# Patient Record
Sex: FEMALE | Race: WHITE | Employment: FULL TIME | URBAN - METROPOLITAN AREA
[De-identification: names, ages, dates, MRNs, and addresses within clinical notes are randomized per-mention and may not be internally consistent; named-entity substitution may affect disease eponyms.]

---

## 2023-09-30 SDOH — ECONOMIC STABILITY: FOOD INSECURITY: WITHIN THE PAST 12 MONTHS, YOU WORRIED THAT YOUR FOOD WOULD RUN OUT BEFORE YOU GOT MONEY TO BUY MORE.: NEVER TRUE

## 2023-09-30 SDOH — ECONOMIC STABILITY: HOUSING INSECURITY
IN THE LAST 12 MONTHS, WAS THERE A TIME WHEN YOU DID NOT HAVE A STEADY PLACE TO SLEEP OR SLEPT IN A SHELTER (INCLUDING NOW)?: NO

## 2023-09-30 SDOH — ECONOMIC STABILITY: FOOD INSECURITY: WITHIN THE PAST 12 MONTHS, THE FOOD YOU BOUGHT JUST DIDN'T LAST AND YOU DIDN'T HAVE MONEY TO GET MORE.: NEVER TRUE

## 2023-09-30 SDOH — ECONOMIC STABILITY: TRANSPORTATION INSECURITY
IN THE PAST 12 MONTHS, HAS LACK OF TRANSPORTATION KEPT YOU FROM MEETINGS, WORK, OR FROM GETTING THINGS NEEDED FOR DAILY LIVING?: NO

## 2023-09-30 SDOH — ECONOMIC STABILITY: INCOME INSECURITY: HOW HARD IS IT FOR YOU TO PAY FOR THE VERY BASICS LIKE FOOD, HOUSING, MEDICAL CARE, AND HEATING?: NOT HARD AT ALL

## 2023-10-01 NOTE — PROGRESS NOTES
10/2/2023    Gavicurtis Rodriguez  1990      PCP: Andre Rowe MD  Patient does see them for regular preventative visits. HPI: 34y.o. year old No obstetric history on file. Here for annual gyn wellness exam.     Bryce Current hx breast cancer in her mother at age 45. Dx in 2018. Brca 1&2 neg. When last seen in 2021, pt was not sure what other mutations her mother was tested for. VZ immune  BC = was trying to conceive. His vas was reversed. His SA was good. He is 40. They are not interested in ART. Were planning to try for a yr, then stop if unsuccessful. She has reg cycles. But very symptomatic with menses so wants mirena replaced. Heavy menses. PMS. Felt good when she had mirena. Patient's last menstrual period was 09/14/2023. Social History     Socioeconomic History    Marital status:      Spouse name: None    Number of children: None    Years of education: None    Highest education level: None   Tobacco Use    Smoking status: Never    Smokeless tobacco: Never   Vaping Use    Vaping Use: Never used   Substance and Sexual Activity    Alcohol use: Yes     Alcohol/week: 5.0 standard drinks of alcohol     Types: 5 Glasses of wine per week     Comment: social    Drug use: Never    Sexual activity: Yes     Partners: Male     Birth control/protection: I.U.D., Surgical     Last pap - NL cytology 2021. No hx abnl paps  Lipids- per PCP  Gardasil - has had  Mammogram- NL at [facility]. 3-2023. BD2      Allergies, medications, past medical and surgical history, social history, family history all reviewed.        Review of Systems      Constitutional:  Denies unexplained weight loss/gain or heat/ cold intolerance/loss of balance  ENT: Denies blurred vision, loss of hearing, hoarseness  Cardiovascular:  Denies chest pain, swelling in legs or feet, shortness of breath when lying flat  Respiratory:  Denies shortness of breath, cough greater than 2 weeks or coughing up blood  Gastro: Denies diarrhea greater than

## 2023-10-02 ENCOUNTER — OFFICE VISIT (OUTPATIENT)
Dept: OBGYN CLINIC | Age: 33
End: 2023-10-02
Payer: COMMERCIAL

## 2023-10-02 VITALS
HEIGHT: 68 IN | SYSTOLIC BLOOD PRESSURE: 120 MMHG | BODY MASS INDEX: 27.28 KG/M2 | DIASTOLIC BLOOD PRESSURE: 78 MMHG | WEIGHT: 180 LBS

## 2023-10-02 DIAGNOSIS — Z80.3 FAMILY HISTORY OF BREAST CANCER IN MOTHER: ICD-10-CM

## 2023-10-02 DIAGNOSIS — Z12.4 SCREENING FOR CERVICAL CANCER: ICD-10-CM

## 2023-10-02 DIAGNOSIS — N88.2 CERVICAL STENOSIS (UTERINE CERVIX): ICD-10-CM

## 2023-10-02 DIAGNOSIS — Z30.430 ENCOUNTER FOR IUD INSERTION: ICD-10-CM

## 2023-10-02 DIAGNOSIS — N92.0 MENORRHAGIA WITH REGULAR CYCLE: ICD-10-CM

## 2023-10-02 DIAGNOSIS — Z11.51 SCREENING FOR HUMAN PAPILLOMAVIRUS (HPV): ICD-10-CM

## 2023-10-02 DIAGNOSIS — Z01.419 WELL WOMAN EXAM WITH ROUTINE GYNECOLOGICAL EXAM: Primary | ICD-10-CM

## 2023-10-02 PROBLEM — Z97.5 IUD (INTRAUTERINE DEVICE) IN PLACE: Status: RESOLVED | Noted: 2021-01-18 | Resolved: 2023-10-02

## 2023-10-02 PROBLEM — G43.109 MIGRAINE WITH AURA AND WITHOUT STATUS MIGRAINOSUS, NOT INTRACTABLE: Status: ACTIVE | Noted: 2021-01-18

## 2023-10-02 PROBLEM — Z97.5 IUD (INTRAUTERINE DEVICE) IN PLACE: Status: ACTIVE | Noted: 2021-01-18

## 2023-10-02 PROCEDURE — 99395 PREV VISIT EST AGE 18-39: CPT | Performed by: OBSTETRICS & GYNECOLOGY

## 2023-10-02 RX ORDER — MISOPROSTOL 100 UG/1
200 TABLET ORAL ONCE
Qty: 2 TABLET | Refills: 0 | Status: SHIPPED | OUTPATIENT
Start: 2023-10-02 | End: 2023-10-02

## 2023-10-02 RX ORDER — LORAZEPAM 0.5 MG/1
1 TABLET ORAL
Qty: 1 TABLET | Refills: 0 | Status: SHIPPED | OUTPATIENT
Start: 2023-10-02 | End: 2023-10-02

## 2023-10-02 RX ORDER — RIZATRIPTAN BENZOATE 10 MG/1
10 TABLET ORAL
COMMUNITY

## 2023-10-04 LAB
CYTOLOGIST CVX/VAG CYTO: NORMAL
CYTOLOGY CVX/VAG DOC THIN PREP: NORMAL
HPV APTIMA: NEGATIVE
Lab: NORMAL
PATH REPORT.FINAL DX SPEC: NORMAL
STAT OF ADQ CVX/VAG CYTO-IMP: NORMAL

## 2023-11-06 ENCOUNTER — OFFICE VISIT (OUTPATIENT)
Dept: SURGERY | Age: 33
End: 2023-11-06
Payer: COMMERCIAL

## 2023-11-06 VITALS — HEIGHT: 68 IN | BODY MASS INDEX: 27.43 KG/M2 | WEIGHT: 181 LBS

## 2023-11-06 DIAGNOSIS — R92.2 DENSE BREAST TISSUE: ICD-10-CM

## 2023-11-06 DIAGNOSIS — Z91.89 AT HIGH RISK FOR BREAST CANCER: ICD-10-CM

## 2023-11-06 DIAGNOSIS — Z80.3 FAMILY HISTORY OF BREAST CANCER: Primary | ICD-10-CM

## 2023-11-06 PROCEDURE — 99203 OFFICE O/P NEW LOW 30 MIN: CPT | Performed by: SURGERY

## 2023-11-06 ASSESSMENT — ENCOUNTER SYMPTOMS
ALLERGIC/IMMUNOLOGIC NEGATIVE: 1
EYES NEGATIVE: 1
GASTROINTESTINAL NEGATIVE: 1
RESPIRATORY NEGATIVE: 1

## 2023-11-06 NOTE — PROGRESS NOTES
11/6/2023    Colten Coats  MRN: [id number]      CHIEF COMPLAINT: Family history of breast cancer      PRIMARY CARE PHYSICIAN: Aria Vuong MD      HISTORY:  Mother diagnosed with breast cancer at 45years old. She had triple positive breast cancer. Was reportedly BRCA negative. Not clear what what genetic testing she had done. Patient started her menstrual cycles at 15. She has no children herself. Her mother is 52years old currently. Patient is a non-smoker. Lifetime risk calculation is 25%. Getting mammograms regularly for the last several years. Her most recent mammogram on 3/23/2023 is reported as benign negative. She denies palpable breast masses, nipple discharge, or breast pain. She is concerned about elevated lifetime risk of breast cancer and wants to be proactive with her screening. REVIEW OF SYSTEMS:  Review of Systems   Constitutional: Negative. HENT: Negative. Eyes: Negative. Respiratory: Negative. Cardiovascular: Negative. Gastrointestinal: Negative. Endocrine: Negative. Genitourinary: Negative. Musculoskeletal: Negative. Skin: Negative. Allergic/Immunologic: Negative. Neurological: Negative. Hematological: Negative. Psychiatric/Behavioral: Negative. Past Medical History:   Diagnosis Date    Anxiety     Migraine        Current Outpatient Medications   Medication Sig Dispense Refill    rizatriptan (MAXALT) 10 MG tablet Take 1 tablet by mouth once as needed for Migraine May repeat in 2 hours if needed      miSOPROStol (CYTOTEC) 100 MCG tablet Take 2 tablets by mouth once for 1 dose Take the am of her appt for IUD placement 2 tablet 0     No current facility-administered medications for this visit.        Family History   Problem Relation Age of Onset    Breast Cancer Mother 45    Migraines Mother     Heart Attack Father 61    Stroke Maternal Grandmother     Cancer Maternal Grandmother         lymphoma     Diabetes

## 2023-11-20 ENCOUNTER — HOSPITAL ENCOUNTER (OUTPATIENT)
Dept: MRI IMAGING | Age: 33
Discharge: HOME | End: 2023-11-23
Attending: SURGERY
Payer: COMMERCIAL

## 2023-11-20 DIAGNOSIS — Z80.3 FAMILY HISTORY OF BREAST CANCER: ICD-10-CM

## 2023-11-20 DIAGNOSIS — Z91.89 AT HIGH RISK FOR BREAST CANCER: ICD-10-CM

## 2023-11-20 DIAGNOSIS — R92.2 DENSE BREAST TISSUE: ICD-10-CM

## 2023-11-20 PROCEDURE — C8908 MRI W/O FOL W/CONT, BREAST,: HCPCS

## 2023-11-20 PROCEDURE — 2580000003 HC RX 258: Performed by: SURGERY

## 2023-11-20 PROCEDURE — 6360000004 HC RX CONTRAST MEDICATION: Performed by: SURGERY

## 2023-11-20 PROCEDURE — A9579 GAD-BASE MR CONTRAST NOS,1ML: HCPCS | Performed by: SURGERY

## 2023-11-20 RX ORDER — SODIUM CHLORIDE 0.9 % (FLUSH) 0.9 %
10 SYRINGE (ML) INJECTION AS NEEDED
Status: DISCONTINUED | OUTPATIENT
Start: 2023-11-20 | End: 2023-11-24 | Stop reason: HOSPADM

## 2023-11-20 RX ADMIN — GADOTERIDOL 16 ML: 279.3 INJECTION, SOLUTION INTRAVENOUS at 16:35

## 2023-11-20 RX ADMIN — SODIUM CHLORIDE, PRESERVATIVE FREE 10 ML: 5 INJECTION INTRAVENOUS at 16:36

## 2024-01-04 ENCOUNTER — TELEPHONE (OUTPATIENT)
Dept: SURGERY | Age: 34
End: 2024-01-04

## 2024-01-04 NOTE — TELEPHONE ENCOUNTER
LVM for patient that \"test results\" were normal, and that she is to return in one year.       ----- Message from Oren Benoit Jr., MD sent at 12/29/2023  3:38 PM EST -----  Please make sure she knows the MRI was OK and she should see me in 1 year  ----- Message -----  From: Franci Mckeon Incoming Orders Results To [facility]  Sent: 11/21/2023   2:38 PM EST  To: Oren Benoit Jr., MD

## 2024-01-07 NOTE — PROGRESS NOTES
IUD Insertion        2024    Gavi Tirado    :  1990 Age:  33 y.o.      Contraceptive method:  none  LMP:  No LMP recorded.- last wk. Ended 2d ago  UCG: neg    Placed with u/s guidance and preprocedure ativan/cytotec d/t cervical stenosis.    /70   Ht 1.727 m (5' 8\")   Wt 82.6 kg (182 lb)   BMI 27.67 kg/m²       Procedure:  Patient was counseled regarding 99% efficacy, risk of irregular bleeding for the first 6 months following insertion, small risk of expulsion or uterine perforation with need for surgical removal.  Recommend she not use menstrual cups, as this may contribute to expulsion. Consent form is signed.    On u/s exam adnexae were normal and uterus was noted to be anterior.  Speculum was inserted to visualize the cervix.  Cervix was cleansed with betadine and a single-toothed tenaculum applied to the anterior lip.  Uterus sounded to 7 cm.   MIrena IUD was inserted per the 's instructions.  String was cut at 3 cm from the OS.  Proper positioning was noted by u/s  Tech was Yuridia    Patient tolerated the procedure well.  Return for recheck appointment in 4wks.    Kenia Hadley MD

## 2024-01-08 ENCOUNTER — OFFICE VISIT (OUTPATIENT)
Dept: OBGYN CLINIC | Age: 34
End: 2024-01-08
Payer: COMMERCIAL

## 2024-01-08 VITALS
DIASTOLIC BLOOD PRESSURE: 70 MMHG | BODY MASS INDEX: 27.58 KG/M2 | HEIGHT: 68 IN | SYSTOLIC BLOOD PRESSURE: 128 MMHG | WEIGHT: 182 LBS

## 2024-01-08 DIAGNOSIS — Z01.812 PRE-PROCEDURE LAB EXAM: ICD-10-CM

## 2024-01-08 DIAGNOSIS — Z30.430 ENCOUNTER FOR INTRAUTERINE DEVICE PLACEMENT: Primary | ICD-10-CM

## 2024-01-08 DIAGNOSIS — N88.2 CERVICAL STENOSIS (UTERINE CERVIX): ICD-10-CM

## 2024-01-08 LAB
HCG, PREGNANCY, URINE, POC: NEGATIVE
VALID INTERNAL CONTROL, POC: YES

## 2024-01-08 PROCEDURE — 81025 URINE PREGNANCY TEST: CPT | Performed by: OBSTETRICS & GYNECOLOGY

## 2024-02-25 NOTE — PROGRESS NOTES
IUD RECHECK    2/26/2024    Gavi Tirado  1990  33 y.o.    HPI:  Here for IUD recheck after insertion Mirena 1-8-24.  She has done well since it was placed.  Bldg today    PE:  /70   Ht 1.727 m (5' 8\")   Wt 81.6 kg (180 lb)   BMI 27.37 kg/m²   Patient in no distress. Alert and oriented x 3. Affect bright.  Pelvic exam: normal external genitalia, vagina, cervix. Strings 3cm from external os.  Perineum and anus normal.     Gavi was seen today for follow-up.    Diagnoses and all orders for this visit:    IUD check up    Doing well. Rtn for AE or prn.    Kenia Hadley MD

## 2024-02-26 ENCOUNTER — OFFICE VISIT (OUTPATIENT)
Dept: OBGYN CLINIC | Age: 34
End: 2024-02-26
Payer: COMMERCIAL

## 2024-02-26 VITALS
HEIGHT: 68 IN | DIASTOLIC BLOOD PRESSURE: 70 MMHG | WEIGHT: 180 LBS | SYSTOLIC BLOOD PRESSURE: 110 MMHG | BODY MASS INDEX: 27.28 KG/M2

## 2024-02-26 DIAGNOSIS — Z30.431 IUD CHECK UP: Primary | ICD-10-CM

## 2024-02-26 PROCEDURE — 99212 OFFICE O/P EST SF 10 MIN: CPT | Performed by: OBSTETRICS & GYNECOLOGY

## 2024-02-26 RX ORDER — OFLOXACIN 3 MG/ML
3 SOLUTION/ DROPS OPHTHALMIC DAILY
COMMUNITY
Start: 2024-01-22

## 2024-04-29 NOTE — PROGRESS NOTES
[facility] - Initial Evaluation   Patient: Gavi Tirado   YOB: 1990      Location: [facility] HEMATOLOGY AND ONCOLOGY - Provider participated in today's evaluation via telemedicine in [place], [place]. [place] completed today's evaluation from [place].   Date of Evaluation: 5/28/2024      Referral Source: Oren Benoit MD   Referral Reason: Z80.3 (ICD-10-CM) - Family history of breast cancer   Z91.89 (ICD-10-CM) - At high risk for breast cancer         Genetic Counselor: Malinda Sandoval MS, [facility]      Gavi Tirado was referred for evaluation for the potential of hereditary cancer due to her family history. Gavi has consented to a visit via telehealth.   Personalized risk assessment was performed and genetic testing options were reviewed.  For full details, please see Risk Assessment and Discussion in this document.  Following genetic counseling, Gavi's action plan is:    PURSUES TESTING: [facility] panel of 27 genes associated with hereditary cancer (including BRCA1 and BRCA2) was offered and accepted.  Test results should be available in approximately 3-4 weeks.     Relevant Personal Medical History   Gavi is a 34-year-old female with no personal history of cancer.     Hormonal history:  Age of Menarche: 15  Age of First Live Birth: N/A  Age of Menopause: Denies  Hormonal Replacement Therapy: Denies     Screening history:  Last mammogram: November 2023, breast MRI. Started mammograms at age 30.Does breast imaging every 6 months (alternating).   History of breast biopsy: Denies  Last pap smear: 10/23  Latest colonoscopy: Denies  History of polyps: N/A  Last dermatological exam: Fall 2023    Social history:  Tobacco use: Denies  Alcohol use: 3-4 times a week    General medical history:  Past Medical History:   Diagnosis Date    Anxiety     Migraine        Surgical history:  Hysterectomy: Denies  Bilateral salpingo oophorectomy: Denies  Past

## 2024-05-28 ENCOUNTER — TELEMEDICINE (OUTPATIENT)
Dept: ONCOLOGY | Age: 34
End: 2024-05-28

## 2024-05-28 DIAGNOSIS — Z80.3 FAMILY HISTORY OF BREAST CANCER IN MOTHER: Primary | ICD-10-CM

## 2024-05-28 DIAGNOSIS — Z80.8 FAMILY HISTORY OF MALIGNANT NEOPLASM OF SKIN: ICD-10-CM

## 2024-05-28 DIAGNOSIS — Z80.7 FAMILY HISTORY OF LYMPHOMA: ICD-10-CM

## 2024-06-03 ENCOUNTER — HOSPITAL ENCOUNTER (OUTPATIENT)
Dept: LAB | Age: 34
Discharge: HOME | End: 2024-06-06
Payer: COMMERCIAL

## 2024-06-03 DIAGNOSIS — Z80.3 FAMILY HISTORY OF BREAST CANCER IN MOTHER: ICD-10-CM

## 2024-06-03 DIAGNOSIS — Z80.8 FAMILY HISTORY OF MALIGNANT NEOPLASM OF SKIN: ICD-10-CM

## 2024-06-03 DIAGNOSIS — Z80.7 FAMILY HISTORY OF LYMPHOMA: ICD-10-CM

## 2024-06-03 LAB
COLLECTION INFORMATION: NORMAL
DATE SENT TO REF LAB: NORMAL
Lab: NORMAL

## 2024-06-03 PROCEDURE — 36415 COLL VENOUS BLD VENIPUNCTURE: CPT

## 2024-06-07 NOTE — PROGRESS NOTES
2024      Gavi Tirado  : 1990  34 y.o.      HPI: pt sent message that she had an xray for an unrelated reason that showed her IUD upside down. The image does indeed show that.   Report reads \"IUD projects over the pelvis \"  Mirena placed . All good on jaden in Feb.     Exam:  /84   Ht 1.727 m (5' 8\")   Wt 82.6 kg (182 lb)   BMI 27.67 kg/m²     Body mass index is 27.67 kg/m².    Pt in no distress. Alert and oriented x3. Affect bright.  Well developed, well nourished.  HEENT: normocephalic, atraumatic. Sclerae nonicteric.  Neuro: grossly normal    US shows:  NL uterus and ovaries  IUD in proper position  Images reviewed.  Official report sent for scanning to chart      Gavi was seen today for ultrasound.    Diagnoses and all orders for this visit:    IUD check up    All ok. Uterus probably RF and RV, making it look like the IUD is upside down on 2D images.   Rtn prn     Kenia Hadley MD

## 2024-06-11 ENCOUNTER — OFFICE VISIT (OUTPATIENT)
Dept: OBGYN CLINIC | Age: 34
End: 2024-06-11

## 2024-06-11 ENCOUNTER — PROCEDURE VISIT (OUTPATIENT)
Dept: OBGYN CLINIC | Age: 34
End: 2024-06-11
Payer: COMMERCIAL

## 2024-06-11 ENCOUNTER — CLINICAL DOCUMENTATION (OUTPATIENT)
Dept: ONCOLOGY | Age: 34
End: 2024-06-11

## 2024-06-11 VITALS
DIASTOLIC BLOOD PRESSURE: 84 MMHG | HEIGHT: 68 IN | SYSTOLIC BLOOD PRESSURE: 120 MMHG | BODY MASS INDEX: 27.58 KG/M2 | WEIGHT: 182 LBS

## 2024-06-11 DIAGNOSIS — Z30.431 IUD CHECK UP: Primary | ICD-10-CM

## 2024-06-11 PROBLEM — F41.9 ANXIETY: Status: ACTIVE | Noted: 2024-06-03

## 2024-06-11 PROCEDURE — 76830 TRANSVAGINAL US NON-OB: CPT | Performed by: OBSTETRICS & GYNECOLOGY

## 2024-06-11 NOTE — PROGRESS NOTES
Date: 6/11/2024    Patient: Gavi Tirado  YOB: 1990    Provider: Malinda Sandoval MS [facility]     Gavi, you were previously referred by Oren Benoit MD  for an initial genetic consultation due to your family history of cancer.  You were seen on May 28th, 2024 and consented to genetic testing, which was sent to [facility]. I called you on June 11th, 2024 to discuss the results of this testing. This letter is a summary of the results.     Results  During our initial meeting, we reviewed your personal and family history. Following discussion of your  history we discussed the benefits, limitations and possible impacts of genetic testing and you pursued a panel called [employer] looking at 27 genes in which pathogenic variants (harmful genetic differences) have been related to increased risk for breast, gynecological and melanoma cancer. The genes included in this panel were  RICHIE, BAP1, BARD1, BRCA1, BRCA2, BRIP1, CDH1, CDK4, CDKN2A, CHEK2, DICER1, EPCAM, MITF, MLH1, MSH2, MSH6, NF1, PALB2, PMS2, POT1, PTEN, RAD51C, RAD51D, RB1, SMARCA4, STK11, TP53.     This testing came back negative, meaning we did not identify a pathogenic variant that increases risk of developing cancer.     During our initial appointment we reviewed the potential reasons for a negative test result, and these include:  There is not a pathogenic variant in your family that increases risk of developing cancer, and all the cases of cancer in your family have been sporadic, or by chance.  There is a pathogenic variant in your family that increases the risk of developing cancer, but you did not inherit this variant.   There is a pathogenic variant in your family that increases the risk of developing cancer but it is located in a gene that was not tested, or an untestable area of a gene.    Considering these various reasons for a negative result, we suggest you check back in with us should there be any

## 2024-11-23 DIAGNOSIS — Z80.3 FAMILY HISTORY OF MALIGNANT NEOPLASM OF BREAST: Primary | ICD-10-CM

## 2024-11-23 DIAGNOSIS — Z91.89 INCREASED RISK OF BREAST CANCER: ICD-10-CM

## 2024-11-23 DIAGNOSIS — R92.333 HETEROGENEOUSLY DENSE TISSUE OF BOTH BREASTS ON MAMMOGRAPHY: ICD-10-CM

## 2024-11-23 NOTE — PROGRESS NOTES
Order placed for breast MRI for pt  Dr Benoit had rec this. He has left BS so she will not be able to see him in future.    Pt had genetic counseling and testing in spring 2024.    [facility] looking at 27 genes in which pathogenic variants (harmful genetic differences) have been related to increased risk for breast, gynecological and melanoma cancer. The genes included in this panel were  RICHIE, BAP1, BARD1, BRCA1, BRCA2, BRIP1, CDH1, CDK4, CDKN2A, CHEK2, DICER1, EPCAM, MITF, MLH1, MSH2, MSH6, NF1, PALB2, PMS2, POT1, PTEN, RAD51C, RAD51D, RB1, SMARCA4, STK11, TP53.   This testing came back negative  Genetics counselor got TC lifetime risk 25.5%

## 2024-12-30 ENCOUNTER — HOSPITAL ENCOUNTER (OUTPATIENT)
Dept: MRI IMAGING | Age: 34
Discharge: HOME | End: 2025-01-02
Attending: OBSTETRICS & GYNECOLOGY
Payer: OTHER GOVERNMENT

## 2024-12-30 DIAGNOSIS — Z80.3 FAMILY HISTORY OF MALIGNANT NEOPLASM OF BREAST: ICD-10-CM

## 2024-12-30 DIAGNOSIS — Z91.89 INCREASED RISK OF BREAST CANCER: ICD-10-CM

## 2024-12-30 DIAGNOSIS — R92.333 HETEROGENEOUSLY DENSE TISSUE OF BOTH BREASTS ON MAMMOGRAPHY: ICD-10-CM

## 2024-12-30 PROCEDURE — 6360000004 HC RX CONTRAST MEDICATION: Performed by: OBSTETRICS & GYNECOLOGY

## 2024-12-30 PROCEDURE — A9579 GAD-BASE MR CONTRAST NOS,1ML: HCPCS | Performed by: OBSTETRICS & GYNECOLOGY

## 2024-12-30 PROCEDURE — C8908 MRI W/O FOL W/CONT, BREAST,: HCPCS

## 2024-12-30 RX ADMIN — GADOTERIDOL 17 ML: 279.3 INJECTION, SOLUTION INTRAVENOUS at 08:46

## 2025-01-20 ENCOUNTER — OFFICE VISIT (OUTPATIENT)
Dept: OBGYN CLINIC | Age: 35
End: 2025-01-20
Payer: COMMERCIAL

## 2025-01-20 VITALS
SYSTOLIC BLOOD PRESSURE: 124 MMHG | HEIGHT: 68 IN | BODY MASS INDEX: 26.83 KG/M2 | DIASTOLIC BLOOD PRESSURE: 72 MMHG | WEIGHT: 177 LBS

## 2025-01-20 DIAGNOSIS — Z11.3 SCREEN FOR STD (SEXUALLY TRANSMITTED DISEASE): ICD-10-CM

## 2025-01-20 DIAGNOSIS — Z11.8 SCREENING EXAMINATION FOR PARASITIC INFECTION: ICD-10-CM

## 2025-01-20 DIAGNOSIS — N89.8 VAGINAL ITCHING: Primary | ICD-10-CM

## 2025-01-20 LAB
BACTERIA, WET MOUNT, POC: NORMAL
CLUE CELLS, WET MOUNT, POC: NORMAL
RBC WET MOUNT, POC: NEGATIVE
TRICH, WET MOUNT, POC: NORMAL
WBC, WET MOUNT, POC: NEGATIVE
YEAST, WET MOUNT, POC: NORMAL

## 2025-01-20 PROCEDURE — 87210 SMEAR WET MOUNT SALINE/INK: CPT | Performed by: NURSE PRACTITIONER

## 2025-01-20 PROCEDURE — 99459 PELVIC EXAMINATION: CPT | Performed by: NURSE PRACTITIONER

## 2025-01-20 PROCEDURE — 99213 OFFICE O/P EST LOW 20 MIN: CPT | Performed by: NURSE PRACTITIONER

## 2025-01-20 NOTE — PROGRESS NOTES
kg/m²        Physical Exam:  Constitutional: She appears well-developed and well-nourished. No distress.   HENT:    Head: Normocephalic and atraumatic.   Cardiovascular: Regular pulse   Pulmonary/Chest: Effort normal  Skin: She is not diaphoretic.   Psychiatric: She has a normal mood and affect. Her behavior is normal. Thought content normal. .  Abdomen:  S/NTTP/ND, no R/G    Pelvic: Normal external genitalia. No lesions, masses, or rashes noted.   Vagina moist, well rugated. Small amount thin white discharge seen. Thick white d/c not seen on exam.   Cervix easily visualized on speculum exam. No lesions or masses noted. No CMT. IUD strings visualized wnl.   Uterus mobile, non-tender. No adnexal tenderness. No adnexal masses noted. No appreciable prolapse noted.     An approved medical chaperone was present for all sensitive portions of the above exam      Counseling:  Patient counseled face to face for more than 50% of the total time spent with the patient.  On this date I have spent 29 minutes reviewing previous notes, test results, and face to face with the patient discussing the diagnosis and importance of compliance with the treatment plan as well as documenting.         ASSESSMENT/PLAN:   34 y.o., , who is seen today due to continued vaginal itching.   -Discussed STD screening with patient during visit. Discussed possibility of insurance not covering portion of STD screening. Patient understanding of this and wishes to proceed with STD screening.    -Pelvic exam findings: Small amount thin white discharge seen. Thick white d/c not seen on exam. IUD strings visualized wnl.    -Nuswab plus w/6 strains yeast collected and will treat if indicated once swab results come back.    -Wet prep obtained as per patient request.    -Wet prep noted with yeast cells presents, no clue cells seen.    -Discussed wet prep results with patient today as well as above symptoms. Will continue Boric acid and await on fungal

## 2025-03-09 SDOH — ECONOMIC STABILITY: INCOME INSECURITY: IN THE LAST 12 MONTHS, WAS THERE A TIME WHEN YOU WERE NOT ABLE TO PAY THE MORTGAGE OR RENT ON TIME?: NO

## 2025-03-09 SDOH — ECONOMIC STABILITY: FOOD INSECURITY: WITHIN THE PAST 12 MONTHS, THE FOOD YOU BOUGHT JUST DIDN'T LAST AND YOU DIDN'T HAVE MONEY TO GET MORE.: NEVER TRUE

## 2025-03-09 SDOH — ECONOMIC STABILITY: FOOD INSECURITY: WITHIN THE PAST 12 MONTHS, YOU WORRIED THAT YOUR FOOD WOULD RUN OUT BEFORE YOU GOT MONEY TO BUY MORE.: NEVER TRUE

## 2025-03-09 SDOH — ECONOMIC STABILITY: TRANSPORTATION INSECURITY
IN THE PAST 12 MONTHS, HAS THE LACK OF TRANSPORTATION KEPT YOU FROM MEDICAL APPOINTMENTS OR FROM GETTING MEDICATIONS?: NO

## 2025-03-09 NOTE — PROGRESS NOTES
3/10/2025    Gavicurtis Herrera Candida  1990      PCP: Vinayak Laguna MD  Patient does see them for regular preventative visits.      HPI: 34 y.o. year old  Here for annual gyn wellness exam.     BC = mirena placed .    Saw our NP -. Had nuswab studies. There are no results in EMR.   Started having burning and itching. Took 2 doses diflucan- no better. Also tried 3d otc yeast med and 200mg diflucan x3d. Has looked like yeast under scope in her office x2. Used boric acid which helped a bit. Still having mild symptoms      Fam hx breast cancer in her mother at age 38. Dx in 2018. Brca 1&2 neg. When last seen in , pt was not sure what other mutations her mother was tested for.   Pt had genetic counseling and testing in spring 2024.    [employer] looking at 27 genes in which pathogenic variants (harmful genetic differences) have been related to increased risk for breast, gynecological and melanoma cancer. The genes included in this panel were  RICHIE, BAP1, BARD1, BRCA1, BRCA2, BRIP1, CDH1, CDK4, CDKN2A, CHEK2, DICER1, EPCAM, MITF, MLH1, MSH2, MSH6, NF1, PALB2, PMS2, POT1, PTEN, RAD51C, RAD51D, RB1, SMARCA4, STK11, TP53.   This testing came back negative  Genetics counselor got TC lifetime risk 25.5%  Pt saw Dr Benoit  for advice about her risk.     No LMP recorded. (Menstrual status: IUD).    Social History     Socioeconomic History    Marital status:    Tobacco Use    Smoking status: Never    Smokeless tobacco: Never   Vaping Use    Vaping status: Never Used   Substance and Sexual Activity    Alcohol use: Yes     Alcohol/week: 5.0 standard drinks of alcohol     Types: 5 Glasses of wine per week     Comment: social    Drug use: Never    Sexual activity: Yes     Partners: Male     Birth control/protection: I.U.D., Surgical     Comment:      Social Drivers of Health     Food Insecurity: No Food Insecurity (3/9/2025)    Hunger Vital Sign     Worried About Running

## 2025-03-10 ENCOUNTER — OFFICE VISIT (OUTPATIENT)
Dept: OBGYN CLINIC | Age: 35
End: 2025-03-10
Payer: COMMERCIAL

## 2025-03-10 VITALS
SYSTOLIC BLOOD PRESSURE: 110 MMHG | WEIGHT: 182 LBS | HEIGHT: 68 IN | BODY MASS INDEX: 27.58 KG/M2 | DIASTOLIC BLOOD PRESSURE: 72 MMHG

## 2025-03-10 DIAGNOSIS — Z30.431 INTRAUTERINE DEVICE SURVEILLANCE: ICD-10-CM

## 2025-03-10 DIAGNOSIS — N92.0 MENORRHAGIA WITH REGULAR CYCLE: ICD-10-CM

## 2025-03-10 DIAGNOSIS — Z01.419 WELL WOMAN EXAM: Primary | ICD-10-CM

## 2025-03-10 DIAGNOSIS — Z80.3 FAMILY HISTORY OF BREAST CANCER IN MOTHER: ICD-10-CM

## 2025-03-10 DIAGNOSIS — B37.31 YEAST VAGINITIS: ICD-10-CM

## 2025-03-10 DIAGNOSIS — R92.30 DENSE BREAST TISSUE: ICD-10-CM

## 2025-03-10 DIAGNOSIS — Z91.89 INCREASED RISK OF BREAST CANCER: ICD-10-CM

## 2025-03-10 PROCEDURE — 99395 PREV VISIT EST AGE 18-39: CPT | Performed by: OBSTETRICS & GYNECOLOGY

## 2025-03-10 RX ORDER — TERCONAZOLE 4 MG/G
CREAM VAGINAL
Qty: 45 G | Refills: 4 | Status: SHIPPED | OUTPATIENT
Start: 2025-03-10

## 2025-03-10 RX ORDER — ESCITALOPRAM OXALATE 5 MG/1
2.5 TABLET ORAL DAILY
COMMUNITY
Start: 2024-11-12

## 2025-03-10 ASSESSMENT — PATIENT HEALTH QUESTIONNAIRE - PHQ9
2. FEELING DOWN, DEPRESSED OR HOPELESS: NOT AT ALL
SUM OF ALL RESPONSES TO PHQ QUESTIONS 1-9: 0
SUM OF ALL RESPONSES TO PHQ QUESTIONS 1-9: 0
1. LITTLE INTEREST OR PLEASURE IN DOING THINGS: NOT AT ALL
SUM OF ALL RESPONSES TO PHQ QUESTIONS 1-9: 0
SUM OF ALL RESPONSES TO PHQ QUESTIONS 1-9: 0

## 2025-03-26 LAB
A VAGINAE DNA VAG QL NAA+PROBE: NORMAL SCORE
BVAB2 DNA VAG QL NAA+PROBE: NORMAL SCORE
C ALBICANS DNA VAG QL NAA+PROBE: NORMAL
C GLABRATA DNA VAG QL NAA+PROBE: NORMAL
C TRACH RRNA SPEC QL NAA+PROBE: NORMAL
MEGA1 DNA VAG QL NAA+PROBE: NORMAL SCORE
N GONORRHOEA RRNA SPEC QL NAA+PROBE: NORMAL
SPECIMEN SOURCE: NORMAL
T VAGINALIS RRNA SPEC QL NAA+PROBE: NORMAL

## 2025-03-27 ENCOUNTER — RESULTS FOLLOW-UP (OUTPATIENT)
Dept: OBGYN CLINIC | Age: 35
End: 2025-03-27

## 2025-03-27 NOTE — RESULT ENCOUNTER NOTE
Nuswab plus collected on 1/20/25 neg for BV, yeast and STIs. In reviewing chart, this swab was sent to [facility] and results scanned into media at end of January however, was never updated in lab section and is reason why I was not notified of results.     Results just updated in labs on 3/26/25